# Patient Record
Sex: FEMALE | Race: WHITE | NOT HISPANIC OR LATINO | Employment: STUDENT | URBAN - METROPOLITAN AREA
[De-identification: names, ages, dates, MRNs, and addresses within clinical notes are randomized per-mention and may not be internally consistent; named-entity substitution may affect disease eponyms.]

---

## 2019-11-06 PROBLEM — G43.709 CHRONIC MIGRAINE WITHOUT AURA OR STATUS MIGRAINOSUS: Status: ACTIVE | Noted: 2019-11-06

## 2019-11-06 RX ORDER — DICLOFENAC POTASSIUM 50 MG/1
50 POWDER, FOR SOLUTION ORAL
COMMUNITY
Start: 2019-03-07 | End: 2019-11-08

## 2019-11-06 RX ORDER — FLUTICASONE PROPIONATE 50 MCG
1 SPRAY, SUSPENSION (ML) NASAL DAILY
COMMUNITY
Start: 2016-12-05 | End: 2019-11-08

## 2019-11-06 RX ORDER — CLINDAMYCIN PHOSPHATE 10 MG/G
GEL TOPICAL
COMMUNITY
Start: 2018-06-26 | End: 2020-05-27

## 2019-11-06 RX ORDER — ONDANSETRON 4 MG/1
4 TABLET, ORALLY DISINTEGRATING ORAL
COMMUNITY
Start: 2017-05-18 | End: 2019-11-08

## 2019-11-06 RX ORDER — LEVONORGESTREL AND ETHINYL ESTRADIOL 0.1-0.02MG
1 KIT ORAL DAILY
COMMUNITY
Start: 2019-06-07 | End: 2022-11-03 | Stop reason: ALTCHOICE

## 2019-11-06 NOTE — PROGRESS NOTES
Subjective   Chief Complaint   Patient presents with   • Annual Exam   • Anxiety       History of Present Illness     24 yo wf presents to [facility] care and for annual physical.     P/P with Dr. Gutierrez ([facility]) who also prescribes her BCP (orsythia). She declines Gardasil and is in a monogamous relationship.      She reports that she has had anxiety. She has not been taking anything for it and has been trying to work through it. She stresses over everything and will have a fast heartbeat with it. She gets frustrated. Her mother also has this and is on Lexapro and does well. She sleeps well. She denies early morning awakenings. She is worried about increased HA with it as well as potential sexual side effects.     Patient Active Problem List   Diagnosis   • Chronic migraine without aura or status migrainosus   • Rosacea   • Anxiety       Allergies   Allergen Reactions   • Septra [Sulfamethoxazole-Trimethoprim] Hives       Current Outpatient Medications on File Prior to Visit   Medication Sig Dispense Refill   • clindamycin (CLINDAGEL) 1 % gel      • levonorgestrel-ethinyl estradiol (AVIANE,ALESSE,LESSINA) 0.1-20 MG-MCG per tablet Take 1 tablet by mouth Daily.     • [DISCONTINUED] Diclofenac Potassium 50 MG pack Take 50 mg by mouth.     • [DISCONTINUED] fluticasone (FLONASE) 50 MCG/ACT nasal spray 1 spray into the nostril(s) as directed by provider Daily.     • [DISCONTINUED] metroNIDAZOLE (METROCREAM) 0.75 % cream      • [DISCONTINUED] ondansetron ODT (ZOFRAN-ODT) 4 MG disintegrating tablet Take 4 mg by mouth.       No current facility-administered medications on file prior to visit.        Past Medical History:   Diagnosis Date   • Anxiety    • Nephrolithiasis    • Right ovarian cyst        Family History   Problem Relation Age of Onset   • Migraines Mother        Social History     Socioeconomic History   • Marital status: Single     Spouse name: Not on file   • Number of children: Not on file   • Years of  "education: Not on file   • Highest education level: Not on file   Tobacco Use   • Smoking status: Never Smoker   • Smokeless tobacco: Never Used   Substance and Sexual Activity   • Alcohol use: No     Frequency: Never       Past Surgical History:   Procedure Laterality Date   • TONSILLECTOMY     • WISDOM TOOTH EXTRACTION         The following portions of the patient's history were reviewed and updated as appropriate: problem list, allergies, current medications, past medical history, past family history, past social history and past surgical history.    Review of Systems   Constitutional: Negative for fatigue.   HENT: Negative for congestion.    Eyes: Negative for visual disturbance.   Respiratory: Negative for shortness of breath.    Cardiovascular: Negative for chest pain.   Gastrointestinal: Negative for blood in stool.   Endocrine: Negative.    Genitourinary: Negative.    Musculoskeletal: Negative for arthralgias.   Skin: Negative for rash.   Allergic/Immunologic: Negative for environmental allergies.   Neurological: Positive for headache.   Hematological: Does not bruise/bleed easily.   Psychiatric/Behavioral: The patient is nervous/anxious.          There is no immunization history on file for this patient.    Objective   Vitals:    11/08/19 1326 11/08/19 1632   BP:  98/64   Pulse:  60   Resp:  10   Weight: 54.4 kg (120 lb)    Height: 160 cm (63\")      Physical Exam   Constitutional: She is oriented to person, place, and time. She appears well-developed and well-nourished.   HENT:   Head: Normocephalic and atraumatic.   Left Ear: External ear normal.   Nose: Nose normal.   Mouth/Throat: Oropharynx is clear and moist.   R-ear impacted.    Eyes: Conjunctivae and EOM are normal. Pupils are equal, round, and reactive to light. No scleral icterus.   Neck: Neck supple. No thyromegaly present.   Cardiovascular: Normal rate, regular rhythm, normal heart sounds and intact distal pulses.   No murmur " heard.  Pulmonary/Chest: Effort normal and breath sounds normal.   Abdominal: Soft. Bowel sounds are normal. She exhibits no distension and no mass. There is no hepatosplenomegaly. There is no tenderness. There is no rebound.   Musculoskeletal:   Ambulates without assistance; no clubbing, cyanosis or edema.    Lymphadenopathy:     She has no cervical adenopathy.   Neurological: She is alert and oriented to person, place, and time.   Skin: Skin is warm and dry.   Psychiatric: She has a normal mood and affect.   Vitals reviewed.      Procedures    Assessment/Plan   Yrn was seen today for annual exam and anxiety.    Diagnoses and all orders for this visit:    Annual physical exam  Comments:  Advised influenza and Tdap. Declines at this time.     Healthcare maintenance  -     Comprehensive Metabolic Panel  -     Lipid Panel With / Chol / HDL Ratio    Anxiety  Comments:  New problem. Will have fasting labs and then consider Fluoxetine 10 mg daily.   Orders:  -     TSH    Chronic migraine without aura without status migrainosus, not intractable  Comments:  New problem; Botox with Dr. Russell.     Impacted cerumen of right ear  Comments:  New problem; Will lavage next week after uses Debrox ear drops as directed.     Records reviewed include recent OV with Dr. Gutierrez (GYN) and Rosa Maria GONZALEZ (neuro) as well as recent PAP and labs with GYN.     Return in about 8 weeks (around 1/3/2020).

## 2019-11-08 ENCOUNTER — OFFICE VISIT (OUTPATIENT)
Dept: INTERNAL MEDICINE | Facility: CLINIC | Age: 24
End: 2019-11-08

## 2019-11-08 VITALS
WEIGHT: 120 LBS | HEIGHT: 63 IN | RESPIRATION RATE: 10 BRPM | SYSTOLIC BLOOD PRESSURE: 98 MMHG | DIASTOLIC BLOOD PRESSURE: 64 MMHG | HEART RATE: 60 BPM | BODY MASS INDEX: 21.26 KG/M2

## 2019-11-08 DIAGNOSIS — H61.21 IMPACTED CERUMEN OF RIGHT EAR: ICD-10-CM

## 2019-11-08 DIAGNOSIS — Z00.00 ANNUAL PHYSICAL EXAM: Primary | ICD-10-CM

## 2019-11-08 DIAGNOSIS — F41.9 ANXIETY: ICD-10-CM

## 2019-11-08 DIAGNOSIS — Z00.00 HEALTHCARE MAINTENANCE: ICD-10-CM

## 2019-11-08 DIAGNOSIS — G43.709 CHRONIC MIGRAINE WITHOUT AURA WITHOUT STATUS MIGRAINOSUS, NOT INTRACTABLE: ICD-10-CM

## 2019-11-08 PROBLEM — L71.9 ROSACEA: Status: ACTIVE | Noted: 2019-11-08

## 2019-11-08 PROBLEM — N83.201 RIGHT OVARIAN CYST: Status: RESOLVED | Noted: 2019-11-08 | Resolved: 2019-11-08

## 2019-11-08 PROCEDURE — 99385 PREV VISIT NEW AGE 18-39: CPT | Performed by: NURSE PRACTITIONER

## 2019-11-13 ENCOUNTER — CLINICAL SUPPORT (OUTPATIENT)
Dept: INTERNAL MEDICINE | Facility: CLINIC | Age: 24
End: 2019-11-13

## 2019-11-13 LAB
ALBUMIN SERPL-MCNC: 4.7 G/DL (ref 3.5–5.2)
ALBUMIN/GLOB SERPL: 1.9 G/DL
ALP SERPL-CCNC: 52 U/L (ref 39–117)
ALT SERPL-CCNC: 13 U/L (ref 1–33)
AST SERPL-CCNC: 14 U/L (ref 1–32)
BILIRUB SERPL-MCNC: 0.3 MG/DL (ref 0.2–1.2)
BUN SERPL-MCNC: 12 MG/DL (ref 6–20)
BUN/CREAT SERPL: 14.1 (ref 7–25)
CALCIUM SERPL-MCNC: 9.3 MG/DL (ref 8.6–10.5)
CHLORIDE SERPL-SCNC: 102 MMOL/L (ref 98–107)
CHOLEST SERPL-MCNC: 165 MG/DL (ref 0–200)
CHOLEST/HDLC SERPL: 3.17 {RATIO}
CO2 SERPL-SCNC: 25 MMOL/L (ref 22–29)
CREAT SERPL-MCNC: 0.85 MG/DL (ref 0.57–1)
GLOBULIN SER CALC-MCNC: 2.5 GM/DL
GLUCOSE SERPL-MCNC: 85 MG/DL (ref 65–99)
HDLC SERPL-MCNC: 52 MG/DL (ref 40–60)
LDLC SERPL CALC-MCNC: 91 MG/DL (ref 0–100)
POTASSIUM SERPL-SCNC: 4.4 MMOL/L (ref 3.5–5.2)
PROT SERPL-MCNC: 7.2 G/DL (ref 6–8.5)
SODIUM SERPL-SCNC: 140 MMOL/L (ref 136–145)
TRIGL SERPL-MCNC: 108 MG/DL (ref 0–150)
TSH SERPL DL<=0.005 MIU/L-ACNC: 1.59 UIU/ML (ref 0.27–4.2)
VLDLC SERPL CALC-MCNC: 21.6 MG/DL

## 2020-01-02 NOTE — PROGRESS NOTES
Subjective   Chief Complaint   Patient presents with   • Anxiety     medication follow up    • Earache     Right ear pressure        History of Present Illness     23 yo wf presents for follow up regarding anxiety for which she was started on sertraline.  Reports panicked feeling has resolved and feels like her anxiety is 60% better. Got engaged over the holidays. 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 Reports fullness in right ear. Denies fever or chills. Denies increased congestion or sinus drainage. No sore throat or swollen nodes.     Patient Active Problem List   Diagnosis   • Chronic migraine without aura or status migrainosus   • Rosacea   • Anxiety       Allergies   Allergen Reactions   • Septra [Sulfamethoxazole-Trimethoprim] Hives       Current Outpatient Medications on File Prior to Visit   Medication Sig Dispense Refill   • clindamycin (CLINDAGEL) 1 % gel      • Diclofenac Potassium 50 MG pack Take 50 mg by mouth.     • levonorgestrel-ethinyl estradiol (AVIANE,ALESSE,LESSINA) 0.1-20 MG-MCG per tablet Take 1 tablet by mouth Daily.     • [DISCONTINUED] sertraline (ZOLOFT) 50 MG tablet Take 1 tablet by mouth Daily. 30 tablet 5     No current facility-administered medications on file prior to visit.        Past Medical History:   Diagnosis Date   • Anxiety    • Nephrolithiasis    • Right ovarian cyst        Family History   Problem Relation Age of Onset   • Migraines Mother        Social History     Socioeconomic History   • Marital status: Single     Spouse name: Not on file   • Number of children: Not on file   • Years of education: Not on file   • Highest education level: Not on file   Tobacco Use   • Smoking status: Never Smoker   • Smokeless tobacco: Never Used   Substance and Sexual Activity   • Alcohol use: No     Frequency: Never       Past Surgical History:   Procedure Laterality Date   • TONSILLECTOMY     • WISDOM TOOTH EXTRACTION         The following portions of the patient's history were  "reviewed and updated as appropriate: problem list, allergies, current medications, past medical history and past social history.    Review of Systems   Constitutional: Negative for chills and fever.   HENT: Positive for ear pain. Negative for congestion.    Psychiatric/Behavioral: The patient is nervous/anxious.          There is no immunization history on file for this patient.    Objective   Vitals:    01/03/20 1446   Weight: 54 kg (119 lb)   Height: 160 cm (63\")     Body mass index is 21.08 kg/m².  Physical Exam   Constitutional: She is oriented to person, place, and time. She appears well-developed and well-nourished.   HENT:   Head: Normocephalic and atraumatic.   Right Ear: External ear normal.   Left Ear: External ear normal.   Nose: Nose normal.   Mouth/Throat: Oropharynx is clear and moist.   +PND.    Neck: Neck supple.   Cardiovascular: S1 normal and S2 normal.   Lymphadenopathy:     She has no cervical adenopathy.   Neurological: She is alert and oriented to person, place, and time.   Skin: Skin is warm and dry.   Psychiatric: She has a normal mood and affect.   Vitals reviewed.      Procedures    Assessment/Plan   Yrn was seen today for anxiety and earache.    Diagnoses and all orders for this visit:    Anxiety  Comments:  Improved but not at goal. Increase sertraline to 100 mg daily.   Orders:  -     sertraline (ZOLOFT) 100 MG tablet; Take 1 tablet by mouth Daily.    Eustachian tube dysfunction, right  Comments:  Use 3 days only.   Orders:  -     oxymetazoline (AFRIN NASAL SPRAY) 0.05 % nasal spray; 2 sprays into the nostril(s) as directed by provider 2 (Two) Times a Day.      Return in about 3 months (around 4/3/2020).           "

## 2020-01-03 ENCOUNTER — OFFICE VISIT (OUTPATIENT)
Dept: INTERNAL MEDICINE | Facility: CLINIC | Age: 25
End: 2020-01-03

## 2020-01-03 VITALS — HEIGHT: 63 IN | WEIGHT: 119 LBS | BODY MASS INDEX: 21.09 KG/M2

## 2020-01-03 DIAGNOSIS — F41.9 ANXIETY: Primary | ICD-10-CM

## 2020-01-03 DIAGNOSIS — H69.81 EUSTACHIAN TUBE DYSFUNCTION, RIGHT: ICD-10-CM

## 2020-01-03 PROBLEM — J30.2 SEASONAL ALLERGIES: Status: ACTIVE | Noted: 2020-01-03

## 2020-01-03 PROCEDURE — 99213 OFFICE O/P EST LOW 20 MIN: CPT | Performed by: NURSE PRACTITIONER

## 2020-01-03 RX ORDER — DICLOFENAC POTASSIUM 50 MG/1
50 POWDER, FOR SOLUTION ORAL
COMMUNITY
Start: 2019-12-30 | End: 2020-05-27

## 2020-01-03 RX ORDER — SERTRALINE HYDROCHLORIDE 100 MG/1
100 TABLET, FILM COATED ORAL DAILY
Qty: 30 TABLET | Refills: 5 | Status: SHIPPED | OUTPATIENT
Start: 2020-01-03 | End: 2020-02-20 | Stop reason: DRUGHIGH

## 2020-01-03 RX ORDER — OXYMETAZOLINE HYDROCHLORIDE 0.05 G/100ML
2 SPRAY NASAL 2 TIMES DAILY
Start: 2020-01-03 | End: 2020-05-27

## 2020-02-20 ENCOUNTER — TELEPHONE (OUTPATIENT)
Dept: INTERNAL MEDICINE | Facility: CLINIC | Age: 25
End: 2020-02-20

## 2020-02-20 DIAGNOSIS — F41.9 ANXIETY: Primary | ICD-10-CM

## 2020-02-20 NOTE — TELEPHONE ENCOUNTER
Please let her know it is ok to back it down to 50 mg--she can cut them in half if the tablets are scored; other wise send her in RX for 50 mg.

## 2020-02-20 NOTE — TELEPHONE ENCOUNTER
Pt call+ing on the Zoloft of 100mg it is giving her migraine headaches would like to go back to 50mg if possible please advise the 100mg is not making her feel .

## 2020-05-26 NOTE — PROGRESS NOTES
Subjective   Chief Complaint   Patient presents with   • Anxiety     You have chosen to receive care through a telehealth visit.  Do you consent to use a video/audio connection for your medical care today? Yes    History of Present Illness   24 y.o. female presents for a telephone visit for follow up regarding anxiety for which she takes sertraline 100 mg--decreased it back down to 50 mg most days; felt as though the 100 mg was too strong for her. Has been taking 100 mg on days she feels her anxiety is worse. Panicked feeling resoled. Anxiety 60% improved. Starts student teaching in the fall.      Patient Active Problem List   Diagnosis   • Chronic migraine without aura or status migrainosus   • Rosacea   • Anxiety   • Seasonal allergies       Allergies   Allergen Reactions   • Septra [Sulfamethoxazole-Trimethoprim] Hives       Current Outpatient Medications on File Prior to Visit   Medication Sig Dispense Refill   • levonorgestrel-ethinyl estradiol (AVIANE,ALESSE,LESSINA) 0.1-20 MG-MCG per tablet Take 1 tablet by mouth Daily.     • [DISCONTINUED] clindamycin (CLINDAGEL) 1 % gel      • [DISCONTINUED] Diclofenac Potassium 50 MG pack Take 50 mg by mouth.     • [DISCONTINUED] oxymetazoline (AFRIN NASAL SPRAY) 0.05 % nasal spray 2 sprays into the nostril(s) as directed by provider 2 (Two) Times a Day.     • [DISCONTINUED] sertraline (ZOLOFT) 50 MG tablet Take 1 tablet by mouth Daily. 30 tablet 5     No current facility-administered medications on file prior to visit.        Past Medical History:   Diagnosis Date   • Anxiety    • Nephrolithiasis    • Right ovarian cyst        Family History   Problem Relation Age of Onset   • Migraines Mother        Social History     Socioeconomic History   • Marital status: Single     Spouse name: Not on file   • Number of children: Not on file   • Years of education: Not on file   • Highest education level: Not on file   Tobacco Use   • Smoking status: Never Smoker   • Smokeless  tobacco: Never Used   Substance and Sexual Activity   • Alcohol use: No     Frequency: Never       Past Surgical History:   Procedure Laterality Date   • TONSILLECTOMY     • WISDOM TOOTH EXTRACTION         The following portions of the patient's history were reviewed and updated as appropriate: problem list, allergies, current medications, past medical history and past social history.    Review of Systems   Psychiatric/Behavioral: Negative for sleep disturbance. The patient is nervous/anxious.        Immunization History   Administered Date(s) Administered   • DTaP, Unspecified 02/16/1998, 04/22/1998, 06/17/1998, 01/28/1999, 05/14/2003   • Hep A, 2 Dose 08/14/2009   • Hep B, Adolescent or Pediatric 1997, 02/16/1998, 06/17/1998   • MMR 01/28/1999, 05/14/2003   • Polio, Unspecified 02/16/1998, 04/22/1998, 01/28/1999, 05/14/2003   • Tdap 08/14/2009   • Varicella 05/14/2003, 08/14/2009       Objective   There were no vitals filed for this visit.  There is no height or weight on file to calculate BMI.  Physical Exam   Neurological: She is alert.   Psychiatric: She has a normal mood and affect.       Procedures    Assessment/Plan   Yrn was seen today for anxiety.    Diagnoses and all orders for this visit:    Anxiety  Comments:  COntinue Zoloft 50 mg--instead of taking an extra Zoloft here and there will add Buspar 5 mg once daily prn.   Orders:  -     sertraline (Zoloft) 50 MG tablet; Take 1 tablet by mouth Daily.  -     busPIRone (BUSPAR) 5 MG tablet; Take one tablet daily for breakthrough anxiety as needed.    Healthcare maintenance  Comments:  Schedule annual physical in November with fasting labs one week prior.   Orders:  -     Comprehensive Metabolic Panel; Future  -     Lipid Panel With / Chol / HDL Ratio; Future     Records reviewed include previous OV with myself.     Return in about 6 months (around 11/27/2020) for Annual physical, Lab B4 FUP.

## 2020-05-27 ENCOUNTER — OFFICE VISIT (OUTPATIENT)
Dept: INTERNAL MEDICINE | Facility: CLINIC | Age: 25
End: 2020-05-27

## 2020-05-27 DIAGNOSIS — F41.9 ANXIETY: Primary | ICD-10-CM

## 2020-05-27 DIAGNOSIS — Z00.00 HEALTHCARE MAINTENANCE: ICD-10-CM

## 2020-05-27 PROCEDURE — 99213 OFFICE O/P EST LOW 20 MIN: CPT | Performed by: NURSE PRACTITIONER

## 2020-05-27 RX ORDER — BUSPIRONE HYDROCHLORIDE 5 MG/1
TABLET ORAL
Qty: 90 TABLET | Refills: 1 | Status: SHIPPED | OUTPATIENT
Start: 2020-05-27 | End: 2022-11-03

## 2020-08-04 ENCOUNTER — TELEMEDICINE (OUTPATIENT)
Dept: INTERNAL MEDICINE | Facility: CLINIC | Age: 25
End: 2020-08-04

## 2020-08-04 DIAGNOSIS — J30.2 SEASONAL ALLERGIC RHINITIS, UNSPECIFIED TRIGGER: Primary | ICD-10-CM

## 2020-08-04 PROCEDURE — 99213 OFFICE O/P EST LOW 20 MIN: CPT | Performed by: PHYSICIAN ASSISTANT

## 2020-08-04 RX ORDER — CETIRIZINE HYDROCHLORIDE 10 MG/1
10 TABLET ORAL DAILY
COMMUNITY

## 2020-08-04 NOTE — PROGRESS NOTES
Subjective   Chief Complaint   Patient presents with   • Exposure To Known Illness       History of Present Illness via video visit    She was exposed to a positive COVID coworkert last week. She has been wearing a mask at work as did the employee she was in contact with. She states her exposure last week was less than 5 minutes. She has been using hand  and washing her hands diligently. She has bad seasonal allergies and has had some pressure under eyes. She had a headache 2 days ago. She has no fever or chills. No cough. No body aches.        Patient Active Problem List   Diagnosis   • Chronic migraine without aura or status migrainosus   • Rosacea   • Anxiety   • Seasonal allergies       Allergies   Allergen Reactions   • Septra [Sulfamethoxazole-Trimethoprim] Hives       Current Outpatient Medications on File Prior to Visit   Medication Sig Dispense Refill   • cetirizine (zyrTEC) 10 MG tablet Take 10 mg by mouth Daily.     • busPIRone (BUSPAR) 5 MG tablet Take one tablet daily for breakthrough anxiety as needed. 90 tablet 1   • levonorgestrel-ethinyl estradiol (AVIANE,ALESSE,LESSINA) 0.1-20 MG-MCG per tablet Take 1 tablet by mouth Daily.     • sertraline (Zoloft) 50 MG tablet Take 1 tablet by mouth Daily. 90 tablet 1     No current facility-administered medications on file prior to visit.        Past Medical History:   Diagnosis Date   • Anxiety    • Nephrolithiasis    • Right ovarian cyst        Family History   Problem Relation Age of Onset   • Migraines Mother        Social History     Socioeconomic History   • Marital status: Single     Spouse name: Not on file   • Number of children: Not on file   • Years of education: Not on file   • Highest education level: Not on file   Tobacco Use   • Smoking status: Never Smoker   • Smokeless tobacco: Never Used   Substance and Sexual Activity   • Alcohol use: No     Frequency: Never       Past Surgical History:   Procedure Laterality Date   • TONSILLECTOMY      • WISDOM TOOTH EXTRACTION           The following portions of the patient's history were reviewed and updated as appropriate: problem list, allergies, current medications, past medical history, past family history, past social history and past surgical history.    Review of Systems   Constitution: Negative for chills, fever and malaise/fatigue.        No body aches   HENT: Positive for congestion.         Rhinorrhea   Respiratory: Negative for cough and wheezing.        Immunization History   Administered Date(s) Administered   • DTaP, Unspecified 02/16/1998, 04/22/1998, 06/17/1998, 01/28/1999, 05/14/2003   • Hep A, 2 Dose 08/14/2009   • Hep B, Adolescent or Pediatric 1997, 02/16/1998, 06/17/1998   • MMR 01/28/1999, 05/14/2003   • Polio, Unspecified 02/16/1998, 04/22/1998, 01/28/1999, 05/14/2003   • Tdap 08/14/2009   • Varicella 05/14/2003, 08/14/2009       Objective   There were no vitals filed for this visit.  There is no height or weight on file to calculate BMI.  Physical Exam  Video capabilities were not working.     Assessment/Plan   Yrn was seen today for exposure to known illness.    Diagnoses and all orders for this visit:    Seasonal allergic rhinitis, unspecified trigger    She is low risk exposure due to both she and her coworker wearing a mask. Her symptoms she is currently experiencing sound more of an allergic rhinitis etiology. I have recommended she start using Afrin x 3 days and then switch to flonase as she is already taking an oral antihistamine. Should this not help, I will be happy to order a COVID test for reassurance. Her office is closed this week for cleaning. She does not return for 1 more week.     Total time of encounter 12 minutes.

## 2020-08-05 ENCOUNTER — TELEPHONE (OUTPATIENT)
Dept: INTERNAL MEDICINE | Facility: CLINIC | Age: 25
End: 2020-08-05

## 2020-08-05 NOTE — TELEPHONE ENCOUNTER
PATIENT STATED SHE HAS BEEN EXPOSED TO SOMEONE WHO HAS COVID.  PATIENT REQUESTS COVID TEST.  PLEASE ADVISE    PATIENT CALL BACK NUMBER: [phone]

## 2020-08-17 ENCOUNTER — OFFICE VISIT (OUTPATIENT)
Dept: INTERNAL MEDICINE | Facility: CLINIC | Age: 25
End: 2020-08-17

## 2020-08-17 VITALS — TEMPERATURE: 97.9 F | HEIGHT: 63 IN | BODY MASS INDEX: 21.97 KG/M2 | WEIGHT: 124 LBS

## 2020-08-17 DIAGNOSIS — B82.9 PARASITES IN STOOL: Primary | ICD-10-CM

## 2020-08-17 PROCEDURE — 99213 OFFICE O/P EST LOW 20 MIN: CPT | Performed by: PHYSICIAN ASSISTANT

## 2020-08-17 NOTE — PROGRESS NOTES
Subjective   Chief Complaint   Patient presents with   • GI Problem     possible tape worm       History of Present Illness     Pt is here today due to possible tape worm. 5 days ago had a bowel movement and saw a small white worm in the toilet. She has had no abdominal pain or cramping. No weight loss, has had increased hunger. She does not eat much meat. She has not traveled outside the country or drank from nearby streams.     She has brought a picture of what she found.      Patient Active Problem List   Diagnosis   • Chronic migraine without aura or status migrainosus   • Rosacea   • Anxiety   • Seasonal allergies       Allergies   Allergen Reactions   • Septra [Sulfamethoxazole-Trimethoprim] Hives       Current Outpatient Medications on File Prior to Visit   Medication Sig Dispense Refill   • busPIRone (BUSPAR) 5 MG tablet Take one tablet daily for breakthrough anxiety as needed. 90 tablet 1   • cetirizine (zyrTEC) 10 MG tablet Take 10 mg by mouth Daily.     • levonorgestrel-ethinyl estradiol (AVIANE,ALESSE,LESSINA) 0.1-20 MG-MCG per tablet Take 1 tablet by mouth Daily.     • sertraline (Zoloft) 50 MG tablet Take 1 tablet by mouth Daily. 90 tablet 1     No current facility-administered medications on file prior to visit.        Past Medical History:   Diagnosis Date   • Anxiety    • Nephrolithiasis    • Right ovarian cyst        Family History   Problem Relation Age of Onset   • Migraines Mother        Social History     Socioeconomic History   • Marital status: Single     Spouse name: Not on file   • Number of children: Not on file   • Years of education: Not on file   • Highest education level: Not on file   Tobacco Use   • Smoking status: Never Smoker   • Smokeless tobacco: Never Used   Substance and Sexual Activity   • Alcohol use: No     Frequency: Never       Past Surgical History:   Procedure Laterality Date   • TONSILLECTOMY     • WISDOM TOOTH EXTRACTION       The following portions of the patient's  "history were reviewed and updated as appropriate: problem list, allergies, current medications, past medical history, past family history, past social history and past surgical history.    Review of Systems   Gastrointestinal: Negative for bloating and abdominal pain.       Immunization History   Administered Date(s) Administered   • DTaP, Unspecified 02/16/1998, 04/22/1998, 06/17/1998, 01/28/1999, 05/14/2003   • Hep A, 2 Dose 08/14/2009   • Hep B, Adolescent or Pediatric 1997, 02/16/1998, 06/17/1998   • MMR 01/28/1999, 05/14/2003   • Polio, Unspecified 02/16/1998, 04/22/1998, 01/28/1999, 05/14/2003   • Tdap 08/14/2009   • Varicella 05/14/2003, 08/14/2009       Objective   Vitals:    08/17/20 1541   Temp: 97.9 °F (36.6 °C)   Weight: 56.2 kg (124 lb)   Height: 160 cm (63\")     Body mass index is 21.97 kg/m².  Physical Exam   Constitutional: She appears well-developed and well-nourished.   HENT:   Head: Normocephalic and atraumatic.   Cardiovascular: Normal rate, regular rhythm and normal heart sounds.   Pulmonary/Chest: Effort normal and breath sounds normal.   Abdominal: Soft. Bowel sounds are normal. She exhibits no distension. There is no tenderness.   Vitals reviewed.    Assessment/Plan   Yrn was seen today for gi problem.    Diagnoses and all orders for this visit:    Parasites in stool  -     Ova & Parasite Examination - Stool, Per Rectum      Reviewed image on patient's phone, it is consistent with with a parasite. She is asymptomatic. Will have her do stool study today for O and P.          "

## 2020-08-24 LAB
O+P SPEC MICRO: NORMAL
O+P STL CONC: NORMAL

## 2020-09-09 DIAGNOSIS — B82.9 PARASITES IN STOOL: Primary | ICD-10-CM

## 2020-09-11 ENCOUNTER — TELEPHONE (OUTPATIENT)
Dept: INTERNAL MEDICINE | Facility: CLINIC | Age: 25
End: 2020-09-11

## 2020-09-11 LAB
O+P SPEC MICRO: NORMAL
O+P STL CONC: NORMAL

## 2020-09-11 NOTE — TELEPHONE ENCOUNTER
PATIENT CALLED IN RETURNING A CALL BACK.  THINKING IT MAY HAVE SOMETHING TO DO WITH THE STOOL SAMPLE SHE DROPPED OFF YESTERDAY. ATTEMPTED WARM TRANSFER BUT WAS UNSUCCESSFUL      PLEASE ADVISE    [phone]

## 2020-11-13 ENCOUNTER — RESULTS ENCOUNTER (OUTPATIENT)
Dept: INTERNAL MEDICINE | Facility: CLINIC | Age: 25
End: 2020-11-13

## 2020-11-13 DIAGNOSIS — Z00.00 HEALTHCARE MAINTENANCE: ICD-10-CM

## 2022-11-03 ENCOUNTER — TELEPHONE (OUTPATIENT)
Dept: INTERNAL MEDICINE | Facility: CLINIC | Age: 27
End: 2022-11-03

## 2022-11-03 ENCOUNTER — HOSPITAL ENCOUNTER (OUTPATIENT)
Dept: GENERAL RADIOLOGY | Facility: HOSPITAL | Age: 27
Discharge: HOME | End: 2022-11-03
Admitting: INTERNAL MEDICINE

## 2022-11-03 ENCOUNTER — OFFICE VISIT (OUTPATIENT)
Dept: INTERNAL MEDICINE | Facility: CLINIC | Age: 27
End: 2022-11-03

## 2022-11-03 VITALS — BODY MASS INDEX: 21.62 KG/M2 | WEIGHT: 122 LBS | HEIGHT: 63 IN

## 2022-11-03 DIAGNOSIS — R10.32 LEFT LOWER QUADRANT ABDOMINAL PAIN: ICD-10-CM

## 2022-11-03 DIAGNOSIS — K58.1 IRRITABLE BOWEL SYNDROME WITH CONSTIPATION: Primary | ICD-10-CM

## 2022-11-03 PROCEDURE — 74018 RADEX ABDOMEN 1 VIEW: CPT

## 2022-11-03 PROCEDURE — 99214 OFFICE O/P EST MOD 30 MIN: CPT | Performed by: INTERNAL MEDICINE

## 2022-11-03 RX ORDER — TRETINOIN 0.5 MG/G
CREAM TOPICAL
COMMUNITY
Start: 2022-08-31

## 2022-11-03 RX ORDER — GALCANEZUMAB 120 MG/ML
INJECTION, SOLUTION SUBCUTANEOUS
COMMUNITY
Start: 2022-10-29

## 2022-11-03 RX ORDER — METHOCARBAMOL 750 MG/1
TABLET, FILM COATED ORAL
COMMUNITY
Start: 2022-10-21 | End: 2022-12-01

## 2022-11-03 RX ORDER — SPIRONOLACTONE 50 MG/1
TABLET, FILM COATED ORAL
COMMUNITY
Start: 2022-10-28

## 2022-11-03 RX ORDER — LISDEXAMFETAMINE DIMESYLATE 40 MG/1
CAPSULE ORAL
COMMUNITY
Start: 2022-09-28

## 2022-11-03 NOTE — PROGRESS NOTES
"Chief Complaint  GI Problem    Subjective        Yrn Bonilla presents to [facility]  History of Present Illness  Since Tuesday she has had multiple episodes of urgent, loose stools- lots of \"milky looking discharge\" doesn't see much brown stool, even loose. She is eating normally.  There is bright red blood.   No rectal pain.   Has long history of bowel issues- she thought mainly related to red meat but she hasn't been eating that.  She eats very little dairy.   Breast augmentation surgery 3 weeks ago- feels like she's been having these problems since. Took oxycodone for a couple of days after surgery.  No GI evaluation in the past.   No recent travel, antibiotics.  Denies any lightheadedness.    Objective   Vital Signs:  Ht 160 cm (63\")   Wt 55.3 kg (122 lb)   BMI 21.61 kg/m²   Estimated body mass index is 21.61 kg/m² as calculated from the following:    Height as of this encounter: 160 cm (63\").    Weight as of this encounter: 55.3 kg (122 lb).    BMI is within normal parameters. No other follow-up for BMI required.      Physical Exam  Constitutional:       General: She is not in acute distress.     Appearance: Normal appearance.   Cardiovascular:      Rate and Rhythm: Normal rate and regular rhythm.   Pulmonary:      Effort: Pulmonary effort is normal.      Breath sounds: Normal breath sounds.   Abdominal:      General: Bowel sounds are normal.      Tenderness: Tenderness: L sided, no guarding or rebound tenderness.   Musculoskeletal:      Right lower leg: No edema.      Left lower leg: No edema.   Lymphadenopathy:      Cervical: No cervical adenopathy.        Result Review :  The following data was reviewed by: Vanessa Colon MD on 11/03/2022:    Data reviewed: Radiologic studies KUB          Assessment and Plan {CC Problem List  Visit Diagnosis   ROS  Review (Popup)  Health Maintenance  Quality  BestPractice  Medications  SmartSets  SnapShot Encounters  Media " :23}  Diagnoses and all orders for this visit:    1. Irritable bowel syndrome with constipation (Primary)  Comments:  check KUB- I think she has a lot of stool - will address when available.  No laxative.   Orders:  -     Ambulatory Referral to Gastroenterology    2. Left lower quadrant abdominal pain  Comments:  as above- KUB noted, to try Miralax and water to treat.  Call with problems.   Orders:  -     XR Abdomen KUB; Future             Follow Up   No follow-ups on file.  Patient was given instructions and counseling regarding her condition or for health maintenance advice. Please see specific information pulled into the AVS if appropriate.

## 2022-11-03 NOTE — TELEPHONE ENCOUNTER
C/o: light bleeding/whenever passing gas or bowel movement (foamy-feathery like consistency), want to talk to someone before making appointment. [phone]

## 2022-12-01 ENCOUNTER — PREP FOR SURGERY (OUTPATIENT)
Dept: SURGERY | Facility: SURGERY CENTER | Age: 27
End: 2022-12-01

## 2022-12-01 ENCOUNTER — OFFICE VISIT (OUTPATIENT)
Dept: GASTROENTEROLOGY | Facility: CLINIC | Age: 27
End: 2022-12-01

## 2022-12-01 VITALS
BODY MASS INDEX: 21.86 KG/M2 | OXYGEN SATURATION: 99 % | WEIGHT: 123.4 LBS | HEIGHT: 63 IN | SYSTOLIC BLOOD PRESSURE: 90 MMHG | TEMPERATURE: 97.3 F | DIASTOLIC BLOOD PRESSURE: 64 MMHG | HEART RATE: 78 BPM

## 2022-12-01 DIAGNOSIS — K59.04 CHRONIC IDIOPATHIC CONSTIPATION: ICD-10-CM

## 2022-12-01 DIAGNOSIS — K62.5 RECTAL BLEEDING: Primary | ICD-10-CM

## 2022-12-01 DIAGNOSIS — R10.32 LEFT LOWER QUADRANT ABDOMINAL PAIN: ICD-10-CM

## 2022-12-01 DIAGNOSIS — R10.30 LOWER ABDOMINAL PAIN: ICD-10-CM

## 2022-12-01 PROCEDURE — 99214 OFFICE O/P EST MOD 30 MIN: CPT | Performed by: NURSE PRACTITIONER

## 2022-12-01 RX ORDER — RIMEGEPANT SULFATE 75 MG/75MG
TABLET, ORALLY DISINTEGRATING ORAL
COMMUNITY
Start: 2022-11-11

## 2022-12-01 RX ORDER — SODIUM CHLORIDE 0.9 % (FLUSH) 0.9 %
10 SYRINGE (ML) INJECTION AS NEEDED
Status: CANCELLED | OUTPATIENT
Start: 2022-12-01

## 2022-12-01 RX ORDER — SODIUM CHLORIDE 0.9 % (FLUSH) 0.9 %
3 SYRINGE (ML) INJECTION EVERY 12 HOURS SCHEDULED
Status: CANCELLED | OUTPATIENT
Start: 2022-12-01

## 2022-12-01 RX ORDER — SODIUM CHLORIDE, SODIUM LACTATE, POTASSIUM CHLORIDE, CALCIUM CHLORIDE 600; 310; 30; 20 MG/100ML; MG/100ML; MG/100ML; MG/100ML
30 INJECTION, SOLUTION INTRAVENOUS CONTINUOUS PRN
Status: CANCELLED | OUTPATIENT
Start: 2022-12-01

## 2022-12-01 NOTE — PATIENT INSTRUCTIONS
Schedule colonoscopy, orders placed.    Two day bowel prep    Start senna 8.6 mg, generic is fine, start with 2 and increase up to 4 per day, adjusting dose based on response.     Keep track on response on bland calendar and adjust senna as discussed    Follow-up visit after colonoscopy to review findings and make additional recommendations if needed.

## 2022-12-01 NOTE — PROGRESS NOTES
"Chief Complaint   Patient presents with   • Constipation           History of Present Illness  26-year-old female presents today for irritable bowel syndrome.  She is a new patient with our practice.    She has lower abdominal pain that occurs after eating, no specific food triggers.   This symptom has been present for many years.  This pain only occurs after eating.    This will last all day once it starts and when she wakes up in am, pain is gone. She feels like she needs to have a bowel movement but is unable.     She reports longstanding constipation with difficulty having bowel movements.  Lower bilateral abdominal pain feels like constipation and she has the sensation of wanting to have a bowel movement but is not successful.    She has bowel movement every 3 days described as bile, liquid with bright red blood on toilet tissue and in the toilet.  Bright red blood has been present for the past couple of weeks.    She has had external hemorrhoids in the past.     She had worsening of symptoms earlier this month after elective surgery and pain medication.  KUB performed by her primary care provider with moderate colonic fecal retention.    No prior EGD or colonoscopy.    No family history of colon cancer or colon polyps.    No family history of inflammatory bowel disease.    She is on daily probiotic and after worsening symptoms earlier this month has started plexus cleanse every am (magnesium and vitamin C).   She feels the cleanse product helps but has not alleviated symptoms, she started after visit with Dr Colon November 2022.      No further treatments or daily medication for constipation.  She would like to avoid powder treatment with MiraLAX if possible.    Review of Systems      Result Review :           Vital Signs:   BP 90/64   Pulse 78   Temp 97.3 °F (36.3 °C)   Ht 160 cm (63\")   Wt 56 kg (123 lb 6.4 oz)   SpO2 99%   BMI 21.86 kg/m²     Body mass index is 21.86 kg/m².     Physical Exam  Vitals " reviewed.   Constitutional:       Appearance: Normal appearance. She is not ill-appearing.   Abdominal:      General: Bowel sounds are normal. There is no distension.      Palpations: Abdomen is soft. Abdomen is not rigid. There is no mass or pulsatile mass.      Tenderness: There is no abdominal tenderness. There is no guarding or rebound.   Neurological:      Mental Status: She is alert.       Assessment and Plan    Diagnoses and all orders for this visit:    1. Rectal bleeding (Primary)    2. Chronic idiopathic constipation    3. Lower abdominal pain       Longstanding constipation with recent x-ray revealing colonic retention of stool, moderate.  Also bright red blood per rectum and with bowel movements.  Recommend colonoscopy for direct visualization.    Will start Senokot, encouraged patient to keep track of dosage and response and adjust dose as needed to help promote more complete and regular bowel movements.    Discussed possible causes of longstanding constipation and lower abdominal pain including slow transit constipation.  If Senokot does not provide predictable improvement or is not tolerated, at her follow-up visit, we can discuss prescription medications for constipation.  Also to consider work-up for constipation including colon transit study, defecating proctogram and anal rectal manometry based on clinical course.    For constipation, it may take trying several different medications to find the right medication regimen to help regulate your bowel movements.      Side effects of medications for constipation include nausea, abdominal pain, headache, diarrhea, cramping and in rare cases severe diarrhea.  The symptoms may be present when you first start the medication and then improve after several doses or may persist and become intolerable.  If any of the medications provided because prolonged and intolerable side effects, discontinue and contact the office.          Patient Instructions   Schedule  colonoscopy, orders placed.    Two day bowel prep    Start senna 8.6 mg, generic is fine, start with 2 and increase up to 4 per day, adjusting dose based on response.     Keep track on response on bland calendar and adjust senna as discussed    Follow-up visit after colonoscopy to review findings and make additional recommendations if needed.           EMR Dragon/Transcription Disclaimer:  This document has been Dictated utilizing Dragon dictation.

## 2023-01-06 ENCOUNTER — OUTSIDE FACILITY SERVICE (OUTPATIENT)
Dept: GASTROENTEROLOGY | Facility: CLINIC | Age: 28
End: 2023-01-06
Payer: COMMERCIAL

## 2023-01-06 PROCEDURE — 45378 DIAGNOSTIC COLONOSCOPY: CPT | Performed by: INTERNAL MEDICINE

## 2023-01-18 ENCOUNTER — OFFICE VISIT (OUTPATIENT)
Dept: GASTROENTEROLOGY | Facility: CLINIC | Age: 28
End: 2023-01-18
Payer: COMMERCIAL

## 2023-01-18 VITALS
TEMPERATURE: 98.4 F | BODY MASS INDEX: 21.67 KG/M2 | HEIGHT: 63 IN | HEART RATE: 74 BPM | WEIGHT: 122.3 LBS | DIASTOLIC BLOOD PRESSURE: 70 MMHG | SYSTOLIC BLOOD PRESSURE: 98 MMHG | OXYGEN SATURATION: 99 %

## 2023-01-18 DIAGNOSIS — K59.04 CHRONIC IDIOPATHIC CONSTIPATION: Primary | ICD-10-CM

## 2023-01-18 DIAGNOSIS — R10.30 LOWER ABDOMINAL PAIN: ICD-10-CM

## 2023-01-18 PROCEDURE — 99214 OFFICE O/P EST MOD 30 MIN: CPT | Performed by: NURSE PRACTITIONER

## 2023-01-18 RX ORDER — SERTRALINE HYDROCHLORIDE 100 MG/1
100 TABLET, FILM COATED ORAL DAILY
COMMUNITY

## 2023-01-18 NOTE — PROGRESS NOTES
"Chief Complaint   Patient presents with   • Follow-up     Review recent EGD and colonoscopy           History of Present Illness  27-year-old female presents today for follow-up after colonoscopy January 6, 2023.  She presents today to discuss longstanding constipation.    She started calm magnesium supplement but did not notice improvement in bowel movements.   She will experience lower bilateral abdominal pain that is worse when constipation is more pronounced.    No family history of colon cancer or colon polyps.      Review of Systems      Result Review :       SCANNED - COLONOSCOPY (01/06/2023)     Vital Signs:   BP 98/70   Pulse 74   Temp 98.4 °F (36.9 °C)   Ht 160 cm (63\")   Wt 55.5 kg (122 lb 4.8 oz)   SpO2 99%   BMI 21.66 kg/m²     Body mass index is 21.66 kg/m².     Physical Exam  Vitals reviewed.   Constitutional:       General: She is not in acute distress.     Appearance: Normal appearance. She is well-developed.   Pulmonary:      Effort: No respiratory distress.   Skin:     Coloration: Skin is not pale.   Neurological:      Mental Status: She is alert.           Assessment and Plan    Diagnoses and all orders for this visit:    1. Chronic idiopathic constipation (Primary)  -     linaclotide (Linzess) 145 MCG capsule capsule; Take 1 capsule by mouth Every Morning Before Breakfast.  Dispense: 16 capsule; Refill: 0  -     linaclotide (Linzess) 290 MCG capsule capsule; Take 1 capsule by mouth Every Morning Before Breakfast.  Dispense: 16 capsule; Refill: 0  -     Plecanatide (Trulance) 3 MG tablet; Take 1 tablet by mouth Daily.  Dispense: 16 tablet; Refill: 0    2. Lower abdominal pain       Reviewed recent colonoscopy.   Support and reassurance provided regarding constipation.  Recommend treatment for constipation with prescription medication.  Patient is aware that it may take several different medications or combination therapy to find the right dose or combination of medication to help promote " more complete and regular bowel movements.  Samples of Linzess 145 mcg, Linzess 290 mcg and Trulance 3 mg provided with instructions regarding dosage and timing of medication.  Patient is agreeable to contact the office with an update or if she has improvement in symptoms we can send a prescription to pharmacy for corresponding medication otherwise additional recommendations will be made based on results of the above samples.  If she does not have improvement with the above medications, to consider Motegrity.    Patient verbalized agreement and understanding with the above plan.          Patient Instructions   For constipation, it may take trying several different medications to find the right medication regimen to help regulate your bowel movements.    You have been given samples of different medications and different dosages to see which medication works best to promote more regular bowel movements with complete evacuation.  Please follow these instructions regarding the samples.  Side effects of medications for constipation include nausea, abdominal pain, headache, diarrhea, cramping and in rare cases severe diarrhea.  The symptoms may be present when you first start the medication and then improve after several doses or may persist and become intolerable.  If any of the medications provided because prolonged and intolerable side effects, discontinue and contact the office.    FIRST Start BAG ONE Linzess 145 mcg.  Take 1 tablet daily 30 minutes before first meal of the day.    If you do not notice more regular complete bowel movements,   STOP Linzess 145 mcg and   START BAG TWO Linzess 290 mcg.    Take 1 tablet daily 30 minutes before first meal of the day.    If you do not notice improvement in bowel habits with Linzess 290 mcg,  STOP Linzess and START BAG THREE Trulance 3 mg once daily.    Trulance can be taken with or without food.      Please contact the office with an update and we will send in medication  that works the best to your pharmacy for regular use.                EMR Dragon/Transcription Disclaimer:  This document has been Dictated utilizing Dragon dictation.

## 2023-01-18 NOTE — PATIENT INSTRUCTIONS
For constipation, it may take trying several different medications to find the right medication regimen to help regulate your bowel movements.    You have been given samples of different medications and different dosages to see which medication works best to promote more regular bowel movements with complete evacuation.  Please follow these instructions regarding the samples.  Side effects of medications for constipation include nausea, abdominal pain, headache, diarrhea, cramping and in rare cases severe diarrhea.  The symptoms may be present when you first start the medication and then improve after several doses or may persist and become intolerable.  If any of the medications provided because prolonged and intolerable side effects, discontinue and contact the office.    FIRST Start BAG ONE Linzess 145 mcg.  Take 1 tablet daily 30 minutes before first meal of the day.    If you do not notice more regular complete bowel movements,   STOP Linzess 145 mcg and   START BAG TWO Linzess 290 mcg.    Take 1 tablet daily 30 minutes before first meal of the day.    If you do not notice improvement in bowel habits with Linzess 290 mcg,  STOP Linzess and START BAG THREE Trulance 3 mg once daily.    Trulance can be taken with or without food.      Please contact the office with an update and we will send in medication that works the best to your pharmacy for regular use.

## 2023-01-23 RX ORDER — PLECANATIDE 3 MG/1
3 TABLET ORAL DAILY
Qty: 16 TABLET | Refills: 0 | COMMUNITY
Start: 2023-01-23